# Patient Record
(demographics unavailable — no encounter records)

---

## 2018-03-01 NOTE — RAD
FRONTAL VIEW CHEST:

 

Date:  03/01/18

 

COMPARISON:  

04/09/17. 

 

INDICATION:

Dyspnea. 

 

FINDINGS:

Cardiac silhouette is enlarged with vascular congestion and interstitial edema bilaterally. Slight bl
unting of the costophrenic sulci present. There is vascular calcification. Leads overlie the chest li
miting detail. 

 

IMPRESSION: 

Evidence of fluid overload. Enlarged cardiac silhouette is present. Correlate for evidence of CHF. 

 

 

POS: [facility]

## 2018-03-01 NOTE — CON
DATE OF CONSULTATION:  03/01/2018

 

REFERRING PHYSICIAN:  [facility].

 

IMPRESSION:  Patient has some generalized weakness, which may be in part due to her hypercalcemia and
 vitamin deficiencies.  I suspect she may have a component of hypotension that is contributing to her
 generalized asterixis.

 

PLAN:

1.  Vitamin supplementation.

2.  Address hypercalcemia.

3.  Monitor orthostatic blood pressures and adjust medications accordingly.

 

HISTORY OF PRESENT ILLNESS:  Ms. Russell is a 64-year-old  female with a past history of end-sta
ge renal disease on hemodialysis.  She reports over the last week, she has gotten weaker in general a
nd was having trouble getting up from a chair.  She is barely able to walk.  She has also noticed jennifer
rly continuous jerking movements of her extremities.  She does not report any headache, nausea, vomit
ing, dizziness or fainting.  She denies a history of hypertension, although this was in her chart.  S
he was brought in for evaluation.  She had an MRI of the brain done, which was unremarkable.  She is 
noted to be relatively low in both B12 and folate.  Her calcium level was 10.1.  She was not anemic. 
 The remainder of her lab was otherwise unremarkable including a CPK.

 

PAST MEDICAL HISTORY:  As listed.

 

ALLERGIES:  NUMEROUS ADHESIVES and LEVAQUIN.

 

SOCIAL HISTORY:  No tobacco or illicit drug use.

 

FAMILY HISTORY:  Noncontributory.

 

REVIEW OF SYSTEMS:  No complaints of chest pain, but some shortness of breath.  Positive for joint pa
in in both of her knees.

 

PHYSICAL EXAMINATION:

GENERAL:  She is a morbidly obese woman lying in bed having dialysis done.

HEENT:  Pupils equal.  Conjunctivae are little muddy.  Oropharynx is somewhat dry.  Cranium normoceph
alic and atraumatic.

NECK:  Supple.

EXTREMITIES:  No cyanosis present.

NEUROLOGIC:  She is alert and cooperative.  Her speech is fluent and clear.  Cranial nerves were inta
ct.  Motor exam showed antigravity strength in all 4 extremities with prominent asterixis.  Gait was 
not testable.  Sensation was intact to light touch.  Could not really assess cerebellar function due 
to her general weakness and asterixis.

 

IMAGING:  MRI images were reviewed.

 

SUMMARY:  There does not appear to be an acute neurologic issue.  I suspect this is more metabolic an
d possibly related to some orthostatic hypotension.

## 2018-03-01 NOTE — CT
CT OF BRAIN PERFORMED WITHOUT CONTRAST ENHANCEMENT:

 

History: Altered mental status. Speech difficulties. 

 

Comparison: 5-16-15

 

FINDINGS: 

Ventricular and cisternal system shows fairly age appropriate change. There are no signs of intracere
bral hemorrhage or extraaxial fluid collections. Mastoid air cells and visualized sinuses are clear. 


 

IMPRESSION: 

No acute intracranial abnormalities. 

 

POS: [facility]

## 2018-03-01 NOTE — ULT
CAROTID DUPLEX SONOGRAM

3/1/18

 

HISTORY:

TIA. Vascular disease.

 

FINDINGS:  

 

RIGHT: 

Minimal plaque. Color and spectral doppler evaluation, peak systolic velocity of 95 cm/s, and IC to C
C ratio of 0.9 suggests no hemodynamically significant stenosis within the extracranial right ICA. An
tegrade flow is present within the vertebral artery. 

 

LEFT: 

Minimal plaque. Color and spectral doppler evaluation, peak systolic velocity of 93 cm/s, and IC to C
C ratio of 0.7 suggests no hemodynamically significant stenosis within the extracranial left ICA. Ant
egrade flow is present within the vertebral artery. 

 

IMPRESSION: 

No sonographic evidence of significant extracranial ICA stenosis. 

 

POS: [facility]

## 2018-03-01 NOTE — PDOC.FPRHP
- History of Present Illness


Chief Complaint: WEAKNESS


History of Present Illness: 





63 yo F w/ PMH incluidng HTN, ESRD, HLD, neuropathy, RACHID, and morbid obesity.


Woke up this morning feeling weak, able to transfer to chair but not walk on 

her own. She felt weak all over and denies focal weakness. She states she 

needed help getting out of bed. Weakness started about 10 days ago and has 

gotten progressively worse. She also notes that she has been having tremors 

specifically in her arms and legs which is new. She fell 2 times on 4 days ago, 

one mechanical getting out of bathtub and the other she was sitting down and 

missed the chair. She has some soreness in the L arm but denies any other pain. 

She states that she has had increased difficulty with speech both with thinking 

of what to say and dysarthria. No headaches, nausea, vomiting, or changes in 

vision. Family notices more confusion as of late.





- Allergies/Adverse Reactions


 Allergies











Allergy/AdvReac Type Severity Reaction Status Date / Time


 


adhesive Allergy   Verified 03/07/18 13:31


 


Latex, Natural Rubber Allergy   Verified 03/07/18 13:31


 


levofloxacin [From Levaquin] Allergy   Verified 03/07/18 13:31


 


povidone-iodine Allergy   Verified 03/07/18 13:31





[From Betadine]     


 


soap [From Betadine] Allergy   Verified 03/07/18 13:31














- Home Medications


 











 Medication  Instructions  Recorded  Confirmed  Type


 


Budesonide-Formoterol [Symbicort 1 puff INH BID 09/09/14 03/07/18 History





160-4.5]    


 


Cholecalciferol (Vitamin D3) 2,000 unit PO DAILY 12/07/15 03/07/18 History





[Vitamin D3]    


 


Montelukast Sodium [Singulair] 10 mg PO DAILY 12/07/15 03/07/18 History


 


Primidone 50 mg PO TID 12/07/15 03/07/18 History


 


Albuterol Sulfate [Proair 90 mcg IH Q6HR PRN 03/01/18 03/07/18 History





Respiclick]    


 


Benzonatate 100 mg PO Q6HR 03/01/18 03/07/18 History


 


Budesonide 0.5 mg IH BID 03/01/18 03/07/18 History


 


Celecoxib 200 mg PO BID 03/01/18 03/07/18 History


 


Fluticasone Propionate [Flonase 1 spray EA NARE HS 03/01/18 03/07/18 History





Nasal Spray]    


 


Midodrine HCl [ProAmatine] 2.5 mg PO ASDIR 03/01/18 03/07/18 History


 


Mupirocin 2% Cream [Bactroban 2% 1 applic TP BID 03/01/18 03/07/18 History





Cream]    


 


Sulfamethoxazole/Trimethoprim 1 tab PO BID 03/01/18 03/07/18 History





[Bactrim DS]    


 


hydrOXYzine [Atarax] 10 mg PO TID PRN 03/01/18 03/07/18 History


 


predniSONE 2.5 mg PO QAM-WM 03/01/18 03/07/18 History


 


Atorvastatin Calcium [Lipitor] 80 mg PO HS 30 Days #30 tab 03/02/18 03/07/18 Rx


 


Clopidogrel Bisulfate [Plavix] 75 mg PO DAILY 90 Days #30 tab 03/02/18 03/07/18 

Rx


 


Cyanocobalamin (Vitamin B-12) 1,000 mcg PO DAILY 60 Days #60 tab 03/02/18 03/07/ 18 Rx





[Vitamin B-12]    


 


Folic Acid [Folvite] 1 mg PO DAILY 30 Days #30 tab 03/02/18 03/07/18 Rx


 


Lanthanum Carbonate [Fosrenol] 500 mg PO TID-WM 30 Days #30 03/02/18 03/07/18 Rx





 tab.chew   


 


Famotidine 20 mg PO BID 03/07/18 03/07/18 History


 


Polyethylene Glycol 3350 [Miralax] 17 gm PO DAILY 03/07/18 03/07/18 History


 


Umeclidinium Bromide [Incruse 1 inh IH DAILY 03/07/18 03/07/18 History





Ellipta]    














- History


PMHx:


 HTN, Gerd, ESRD, HLD, RACHID, Morbidly obesity, Asthma





PSHx: 


Appendectomy, Splenectomy, 





FHx:


 Father- CAD 78


Mother- 70 infection?, patient unsure





Social:


 no tobacco, no alcohol, no drugs











- Review of Systems


General: reports: fatigue.  denies: fever/chills, weight/appetite/sleep changes


Eyes: denies: vision changes


ENT: denies: nasal congestion, rhinorrhea


Respiratory: reports: shortness of breath (with activity).  denies: cough


Cardiovascular: reports: edema (little worse than usual).  denies: chest pain, 

palpitation


Gastrointestinal: reports: constipation (last bm 4 days ago).  denies: nausea, 

vomiting, diarrhea


Genitourinary: reports: other (denies hematuria).  denies: dysuria, polyuria


Skin: denies: rashes, itching


Musculoskeletal: reports: arthritis/arthralgias.  denies: tenderness (L shoulder

)


Neurological: reports: other (no tingling, see hpi).  denies: numbness


Psychological: denies: anxiety, depression





- Vital signs


BP: 144/95  HR: 72 RR: 16 Tmax: 97.7 Pox: 99% on 2L  Wt: 131.7   








- Physical Exam


Constitutional: NAD, awake, alert and oriented


HEENT: normocephalic and atraumatic, PERRLA, EOMI, grossly normal vision


Neck: supple (very large neck)


Heart: RRR, normal S1/S2, no murmurs/rubs/gallops


Lungs: CTAB, other (not moving much air, very mild wheezes)


Abdomen: soft, non-tender, bowel sounds present


Musculoskeletal: normal structure


Neurological: CN II-XII intact, normal sensation, other (patient able to move 

all extremities, clear intention tremor in upper and lower extremities, able to 

lift legs off beds, cannot lift R arm off bed, able to lift L arm off bed, 

dysmetria/dysdiadocokinesia)


Skin: capillary refill <2 seconds, other (large dressed wound on abdomen 

reportedly from heating pad injury)





FMR H&P: Results





- Labs


Result Diagrams: 


 03/02/18 05:20





 03/02/18 05:20


Lab results: 


 











WBC  13.8 thou/uL (4.8-10.8)  H  03/01/18  08:45    


 


Hgb  12.0 g/dL (12.0-16.0)   03/01/18  08:45    


 


Hct  39.0 % (36.0-47.0)   03/01/18  08:45    


 


MCV  101.0 fl (81.0-99.0)  H  03/01/18  08:45    


 


Plt Count  475 thou/uL (130-400)  H  03/01/18  08:45    


 


Band Neuts % (Manual)  1 % (5-11)  L  03/01/18  08:45    


 


Sodium  136 mmol/L (136-145)   03/01/18  08:45    


 


Potassium  3.8 mmol/L (3.5-5.1)   03/01/18  08:45    


 


Chloride  93 mmol/L ()  L  03/01/18  08:45    


 


Carbon Dioxide  28 mmol/L (23-31)   03/01/18  08:45    


 


BUN  34 mg/dL (9.8-20.1)  H  03/01/18  08:45    


 


Creatinine  7.56 mg/dL (0.6-1.1)  H  03/01/18  08:45    


 


Glucose  86 mg/dL ()   03/01/18  08:45    


 


Calcium  11.1 mg/dL (7.8-10.44)  H  03/01/18  08:45    


 


Total Bilirubin  0.4 mg/dL (0.2-1.2)   03/01/18  08:45    


 


AST  19 U/L (5-34)   03/01/18  08:45    


 


ALT  18 U/L (8-55)   03/01/18  08:45    


 


Alkaline Phosphatase  69 U/L ()   03/01/18  08:45    


 


Creatine Kinase  151 U/L ()   03/01/18  08:45    


 


CK-MB (CK-2)  2.1 ng/mL (0-6.6)   03/01/18  08:45    


 


B-Natriuretic Peptide  28.1 pg/mL (0-100)   03/01/18  08:45    


 


Serum Total Protein  6.9 g/dL (6.0-8.3)   03/01/18  08:45    


 


Albumin  3.7 g/dL (3.4-4.8)   03/01/18  08:45    


 


Lipase  11 U/L (8-78)   03/01/18  08:45    














FMR H&P: A/P





- Problem List


(1) Tremor


Status: Acute   Code(s): R25.1 - TREMOR, UNSPECIFIED   





(2) Anemia in chronic kidney disease (CKD)


Status: Acute   Code(s): N18.9 - CHRONIC KIDNEY DISEASE, UNSPECIFIED; D63.1 - 

ANEMIA IN CHRONIC KIDNEY DISEASE   





(3) CHF (congestive heart failure)


Status: Acute   Code(s): I50.9 - HEART FAILURE, UNSPECIFIED   





(4) Peripheral neuropathy


Status: Acute   Code(s): G62.9 - POLYNEUROPATHY, UNSPECIFIED   





(5) Physical deconditioning


Status: Acute   Code(s): R53.81 - OTHER MALAISE   





(6) BMI 50.0-59.9, adult


Status: Chronic   Code(s): Z68.43 - BODY MASS INDEX (BMI) 50-59.9 , ADULT   





(7) ESRD (end stage renal disease) on dialysis


Status: Chronic   Code(s): N18.6 - END STAGE RENAL DISEASE; Z99.2 - DEPENDENCE 

ON RENAL DIALYSIS   





(8) Gastroesophageal reflux disease


Status: Chronic   Code(s): K21.9 - GASTRO-ESOPHAGEAL REFLUX DISEASE WITHOUT 

ESOPHAGITIS   


Qualifiers: 


   Esophagitis presence: esophagitis presence not specified   Qualified Code(s)

: K21.9 - Gastro-esophageal reflux disease without esophagitis   





(9) History of splenectomy


Status: Chronic   Code(s): Z90.81 - ACQUIRED ABSENCE OF SPLEEN   





(10) Hyperlipidemia


Status: Chronic   Code(s): E78.5 - HYPERLIPIDEMIA, UNSPECIFIED   





(11) RACHID on CPAP


Status: Chronic   Code(s): G47.33 - OBSTRUCTIVE SLEEP APNEA (ADULT) (PEDIATRIC)

; Z99.89 - DEPENDENCE ON OTHER ENABLING MACHINES AND DEVICES   





(12) Progressive focal motor weakness


Status: Acute   Code(s): R53.1 - WEAKNESS   





- Plan





# Generalized Weakness


- CT head negative


- Brain MRI


- Neurology consulted


- ASA


-PT/OT/ST


- rule out CVA





# New onset tremors


- neuro consulted


- B12, folate, ESR, TSH





# ESRD


- Nephro Dr. Cleveland consulted


- T, TH, S





# HLD


- Atorvastatin





# RACHID


- CPAP at night





# DM2


- patient denies taking meds


- check A1C





# GERD


- ranitidine





# Subjective Asthma


- Duonebs q4 PRN





# History of Splenectomy


- monitor for signs of infxn





# PPx


- SCDs


- hold pharmacologic until Brain MRI resulted





# 





FMR H&P: Upper Level





- Pertinent history


 64 year old  female with a past medical history of ESRD on Tuesday, 

Thursday, Saturday dialysis who presents for generalized weakness ongoing for 

several days. She had difficulty speaking yesterday. The only other symptoms 

she reports are fatigue and constipation. She denies fevers, chills, blurry 

vision, visual disturbance, rhinorrhea, nasal congestion, sore throat, chest 

pain, dyspnea, wheezing, cough, abdominal pain, nausea, vomiting, and diarrhea.

  Patient and her nephew who helps her at home report no facial droop, 

unilateral weakness/paralysis.








- Pertinent findings


 Patient obese. Awake, alert, and appropriately interactive. Lungs CTAB. No LE 

edema. CN II-XII intact. No facial droop. Generalized weakness on exam. Abdomen 

soft, NT, ND, +BS.








- Plan


Date/Time: 03/01/18 1229








I, Emerson Liang DO, have evaluated this patient and agree with findings/plan 

as outlined by intern resident. Pertinent changes/additions are listed here.








64 year old  female presents with:


1) Suspected TIA - Admit to stroke unit. MRI pending. CT negative. Stroke team 

consulted. Neurology consulted. Continue aspirin and statin


2) ESRD on dialysis with evidence of acute fluid overload - Will consult patient

's nephrologist, Dr. Cleveland. She is on Tuesday, Thursday, and Saturday dialysis. 

Patient is on Home O2 and denies respiratory symptoms


3) Leukocytosis - No clear cause. CXR not consistent with pneumonia. Patient 

does not produce urine. Blood culture collected. Antibiotics if fever or 

symptoms present








Attending Addendum





- Attending Addendum


Date/Time: 03/31/18 1312





I personally evaluated the patient and discussed the management with Dr. Vasquez 

on 3/1/17


I agree with the History, Examination, Assessment and Plan documented above 

with any addition or exceptions noted below.


63 yo F w/ PMH incluidng HTN, ESRD, HLD, neuropathy, RACHID, and morbid obesity 

here with worsening weakness and decreasing mental alertness and responsiveness 

for Neuro eval.

## 2018-03-01 NOTE — CON
DATE OF CONSULTATION:  03/01/2018

 

HISTORY OF PRESENT ILLNESS:  Ms. Russell is a 64-year-old  female with ESRD, having maintenance 
hemodialysis, and was admitted for generalized weakness.  According to the patient, she was not able 
to get out of bed.  Weakness was generalized and she could hardly move about.  For that reason, she w
as sent to the ER for further evaluation.

 

Essentially, a rule out CVA was done.  She underwent a CT scan of the brain without findings of any a
cute intracranial abnormalities.  Brain MRI also showed no acute abnormalities except for a mild 
ricky ischemic white matter changes.

 

We are being consulted for maintenance hemodialysis.  Today is her regular dialysis day.  I am alberto shepard dialyzing the patient, and I am at the bedside supervising her dialysis.  I am attempting about 4
 liter fluid removal.

 

REVIEW OF SYSTEMS:  Positive for generalized malaise, decreased motor strength with the lower extremi
ties.  No nausea, no vomiting.  Positive for chronic shortness of breath.  No chest pain, no diarrhea
, no constipation, no abdominal pain.  No fever or chills.  No diplopia, no syncopal episode, no hema
tochezia, no melena, no hematemesis.

 

MEDICATIONS:  DuoNeb q.4 hours p.r.n., Ecotrin 325 mg daily, Lipitor 80 mg at bedtime, Plavix 75 mg o
nce a day, Colace 100 mg p.o. b.i.d., hydralazine 10 mg IV q.4 hours p.r.n., Zofran 4 mg IV q.6 hours
 p.r.n.

 

HOME MEDICATIONS:  Fosrenol 3 tablets q.i.d. with meals, midodrine 2.5 mg daily as needed, Singulair 
10 mg every day, Lyrica 1-2 capsules p.o. t.i.d., primidone 50 mg p.o. t.i.d., Bactrim DS 1 tab b.i.d
., prednisone 2.5 mg q.a.m.

 

PAST MEDICAL HISTORY:

1.  ESRD, currently on maintenance hemodialysis.

2.  Hyperlipidemia.

3.  Morbid obesity.

4.  Longstanding hypertension.

5.  COPD.

6.  Obstructive sleep apnea.

7.  Hyperphosphatemia.

8.  GERD.

9.  Coronary artery disease.

 

PAST SURGICAL HISTORY:

1.  Status post PD catheter placement with subsequent removal.

2.  Status post AV fistula placement.

3.  Status post cuffed hemodialysis catheter placement.

4.  Status post upper and lower GI endoscopy.

5.  Status post exploratory laparotomy with splenectomy.

 

SOCIAL HISTORY:  The patient is a retired  from [employer]  Education, high school.  Lives i
n [place].  Single, no children.  Sedentary lifestyle.  No IV drug abuse.  Status post blood transfusio
n.  No alcohol intake.  No history of smoking.

 

FAMILY HISTORY:  Positive family history of ESRD.

 

ALLERGIES:  None.

 

TRAUMA:  None.

 

IMMUNIZATIONS:  Up-to-date.

 

HOSPITALIZATIONS:  Please see past medical history.

 

PHYSICAL EXAMINATION:

VITAL SIGNS:  Blood pressure is noted at 122/53, heart rate 71, respiratory rate 12, pulse ox 98%.

GENERAL:  Awake, alert, comfortable, morbidly obese.

SKIN:  Adequate turgor.

HEENT:  Pinkish conjunctivae.  Anicteric sclerae.

NECK:  No neck mass, no carotid bruits, no JVD.

CHEST:  No deformities.

LUNGS:  Clear breath sounds.  No wheezing, no crackles.

HEART:  Normal sinus rhythm.  No murmur, no gallops, no rubs.

ABDOMEN:  Globular, soft, nontender.  No masses.

EXTREMITIES:  No edema, no deformities.

 

LABORATORY DATA:  03/01/2018, white count 13.8, hemoglobin 12.  Sodium 136, potassium 3.8, chloride 9
3, carbon dioxide 28, BUN is 34, creatinine 7.56, calcium 11.1, AST 19, ALT 18.

 

ASSESSMENT AND PLAN:

1.  Mild hypercalcemia.  We will simply observe this.  Adjust binders as needed.  Currently, she is n
ot on any binders.  She will probably need Fosrenol 1000 mg t.i.d. with meals.

2.  End-stage renal disease, stable.  Tolerating current hemodialysis regimen.  Attempting 4 liter fl
uid removal with this patient.  Please note, she has a history of volume overload.  For this reason, 
we are maxing out fluid removal.

3.  Generalized malaise/weakness - unclear etiology.  CAT scan was negative.  If needed, we can consi
[facility] Neurology consult.  It is possible that this could be related from some of her medications.  Laurel graves note, she takes primidone and Lyrica.  We will hold that medication for the moment.

## 2018-03-01 NOTE — MRI
MRI BRAIN NONCONTRAST:

 

DATE: 

3-1-18

 

HISTORY:

64-year-old female with altered mental status and dysarthria. Rule out CVA.

 

FINDINGS:

The ventricles are normal in size and configuration.  There is no restricted diffusion, midline shift
 or any other mass effect, recent intraaxial hemorrhage, or extraaxial fluid collection.  There are a
 few scattered punctate T2-hyperintensities in the cerebral white matter consistent with mild chronic
 ischemic white matter changes due to mild microvascular atherosclerosis.

 

IMPRESSION:

1. Mild chronic ischemic white matter changes.

2. Otherwise negative.

 

 

jn

 

POS: [facility]

## 2018-03-02 NOTE — PDOC.FM
- Subjective


Subjective: 





This morning Mrs. Russell states she had some anxiety overnight which made her 

feel short of breath. She states this is a common occurrence at home, when it 

happens she takes a duoneb and this resolves it. She denies any pain this 

morning. She states she still feels weak but her tremors are improved.





- Objective


Vital Signs & Weight: 


 Vital Signs (12 hours)











  Temp Pulse Pulse Resp BP BP Pulse Ox


 


 03/02/18 08:04   88   22 H    90 L


 


 03/02/18 07:20    84   127/60  


 


 03/02/18 07:05  97.9 F  83   20   107/52 L  84 L


 


 03/02/18 04:00  98.4 F  88   16   109/55 L  90 L


 


 03/02/18 01:08        93 L











I&O: 


 











 03/01/18 03/02/18 03/03/18





 06:59 06:59 06:59


 


Intake Total  120 


 


Output Total  0 


 


Balance  120 











Result Diagrams: 


 03/02/18 05:20





 03/02/18 05:20





<Warren Rock - Last Filed: 03/02/18 08:40>





- Objective


Vital Signs & Weight: 


 Weight











Admit Weight                   131.723 kg


 


Weight                         131.723 kg














I&O: 


 











 03/02/18 03/03/18 03/04/18





 06:59 06:59 06:59


 


Intake Total 120  


 


Output Total 0  


 


Balance 120  











Result Diagrams: 


 03/02/18 05:20





 03/02/18 05:20





<Marisela Mohan - Last Filed: 03/03/18 09:14>





Phys Exam





- Physical Examination


HEENT: PERRLA, moist MMs


Neck: no nodes, full ROM


large neck


mild expiratory wheezes bilaterally, good air movement


Cardiovascular: RRR, no significant murmur


Gastrointestinal: soft, non-tender, positive bowel sounds


Musculoskeletal: pulses present, edema present


trace edema


intention tremor persists, able to lift all four extremities off bed


Psychiatric: normal affect, A&O x 3


Skin: no rash, normal turgor, cap refill <2 seconds





<Warren Rock - Last Filed: 03/02/18 08:40>





Dx/Plan


(1) Tremor


Code(s): R25.1 - TREMOR, UNSPECIFIED   Status: Acute   





(2) Anemia in chronic kidney disease (CKD)


Code(s): N18.9 - CHRONIC KIDNEY DISEASE, UNSPECIFIED; D63.1 - ANEMIA IN CHRONIC 

KIDNEY DISEASE   Status: Acute   





(3) CHF (congestive heart failure)


Code(s): I50.9 - HEART FAILURE, UNSPECIFIED   Status: Acute   





(4) Peripheral neuropathy


Code(s): G62.9 - POLYNEUROPATHY, UNSPECIFIED   Status: Acute   





(5) Physical deconditioning


Code(s): R53.81 - OTHER MALAISE   Status: Acute   





(6) BMI 50.0-59.9, adult


Code(s): Z68.43 - BODY MASS INDEX (BMI) 50-59.9 , ADULT   Status: Chronic   





(7) ESRD (end stage renal disease) on dialysis


Code(s): N18.6 - END STAGE RENAL DISEASE; Z99.2 - DEPENDENCE ON RENAL DIALYSIS 

  Status: Chronic   





(8) Gastroesophageal reflux disease


Code(s): K21.9 - GASTRO-ESOPHAGEAL REFLUX DISEASE WITHOUT ESOPHAGITIS   Status: 

Chronic   


  QualifierTitle:    Esophagitis presence: esophagitis presence not specified   

Qualified Code(s): K21.9 - Gastro-esophageal reflux disease without esophagitis

   





(9) History of splenectomy


Code(s): Z90.81 - ACQUIRED ABSENCE OF SPLEEN   Status: Chronic   





(10) Hyperlipidemia


Code(s): E78.5 - HYPERLIPIDEMIA, UNSPECIFIED   Status: Chronic   





(11) RACHDI on CPAP


Code(s): G47.33 - OBSTRUCTIVE SLEEP APNEA (ADULT) (PEDIATRIC); Z99.89 - 

DEPENDENCE ON OTHER ENABLING MACHINES AND DEVICES   Status: Chronic   





(12) Progressive focal motor weakness


Code(s): R53.1 - WEAKNESS   Status: Acute   





- Plan


Plan: 





# Generalized Weakness


- CT head negative


- Brain MRI shows chronic changes


- Neurology consulted


- ASA


-PT/OT/ST


- TIA unlikely





# New onset tremors


- neuro consulted


- B12/folate are low, will replace





# ESRD


- Nephro Dr. Cleveland consulted


- T, TH, S


- fluid removed in dialysis





# HLD


- Atorvastatin





# RACHID


- CPAP at night





# DM2


- patient denies taking meds


- check A1C





# GERD


- ranitidine





# Subjective Asthma


- Duonebs q4 PRN





# History of Splenectomy


- monitor for signs of infxn





# PPx


- SCDs








<Warren Rock - Last Filed: 03/02/18 08:40>





Attending Addendum





- Attending Addendum


Date/Time: 03/03/18 0911





I personally evaluated the patient and discussed the management with Dr. Rock 

on 3/2/18.


I agree with the History, Examination, Assessment and Plan documented above 

with any addition or exceptions noted below.


Patient's generalized weakness likely multifactorial, initiated by medication 

interaction with Lyrica, electrolyte abnormalities caused by dialysis 

noncompliance, and concern for TIA.  Now severely deconditioned.  Will start 

secondary stroke prevention for TIA with 90 days of ASA/Plavix followed by 

lifetime treatment with ASA and statin.  PT/OT eval today, pt would strongly 

benefit from inpt rehab.  In addition, will hold Lyrica to see if symptoms 

resolve.





<Marisela Mohan - Last Filed: 03/03/18 09:14>

## 2018-03-03 NOTE — DIS-2
DATE OF ADMISSION:  03/01/2018

 

DATE OF DISCHARGE:  03/02/2018

 

RESIDENT:  Dr. Warren Rock.

 

ADMITTING ATTENDING:  Dr. Lucio Riggs.

 

DISCHARGE ATTENDING:  Dr. Marisela Mohan.

 

CONSULTATIONS:  Neurology.

 

PROCEDURES:  None.

 

PRIMARY DIAGNOSIS:  Generalized weakness.

 

SECONDARY DIAGNOSES:  Cerebrovascular accident history, new onset tremors, ESRD, hyperlipidemia, obst
ructive sleep apnea, diabetes type 2, GERD, asthma, chronic kidney disease.

 

DISCHARGE MEDICATIONS:  Atorvastatin 80 mg, Plavix 75 mg for 90 days, vitamin D12, folic acid, Fosren
ol 500 mg t.i.d., midodrine 2.5 mg, Benzonatate 100 mg, Symbicort, celecoxib, hydroxyzine, DuoNeb, mo
ntelukast, primidone, ranitidine.

 

DISCONTINUED MEDICATIONS:  Calcium and Lyrica.

 

HISTORY OF PRESENT ILLNESS AND HOSPITAL COURSE:  This 64-year-old female with past medical history in
cluding hypertension, ESRD, hyperlipidemia, neuropathy, obstructive sleep apnea, morbid obesity, pres
ented to the ED after feeling excessively weak the morning of presentation.  She says she felt weak a
ll over and denied any focal weakness.  She states she has new onset tremor, which has been going on 
for about 10 days and getting progressively worse.  She states that she is unable to transfer from be
d to wheelchair, which she was formally able to do, this has been going on for the last 10 days or so
.  She also thinks she has increased difficulty with speech both thinking what to say and dysarthria.
  Denies headaches, nausea, vomiting or changes in vision.  Family has noticed more confusion as of l
ate.

 

CT head in the ER was negative.  Brain MRI showed chronic CVA changes.  Neurology came and visited th
e patient and did not think this was neurological in nature, but rather metabolic.  The patient's Lyr
ica was discontinued as this is known to cause tremors in dialysis patients.  Furthermore, the patien
t was started on Fosrenol renal due to mildly high calcium at 10.4.  The patient's B12 and folate wer
e both low in the hospital.  Therefore, she was started on supplementation.  The patient is discharge
d to inpatient rehabilitation.  Family is in agreement with this plan.

 

DISPOSITION:  Stable.

 

DISCHARGE INSTRUCTIONS:

1.  Location:  Home.

2.  Diet:  Regular.

3.  Activity:  As tolerated.

4.  Follow up with Dr. Marisela Mohan in clinic within 1 week.

## 2018-03-03 NOTE — EKG
Test Reason : WEAK

Blood Pressure : ***/*** mmHG

Vent. Rate : 073 BPM     Atrial Rate : 073 BPM

   P-R Int : 226 ms          QRS Dur : 138 ms

    QT Int : 444 ms       P-R-T Axes : 073 -07 088 degrees

   QTc Int : 489 ms

 

Sinus rhythm with 1st degree A-V block

Left bundle branch block

Abnormal ECG

 

Confirmed by MARYANNE RAMIREZ, MAHIN ([id number]),  DEEPAK HATFIELD ([id number]) on 3/3/2018 1:04:30 PM

 

Referred By:             Confirmed By:MAHIN MADDEN MD

## 2018-03-08 NOTE — SPC
DIALYSIS FISTULOGRAM LEFT UPPER EXTREMITY

PERCUTANEOUS BALLOON ANGIOPLASTY 

LEFT UPPER EXTREMITY DIALYSIS FISTULA:

 

History: Renal failure. Poor function and difficult access of left upper extremity fistula. 

 

FINDINGS: 

After explaining the procedure and answering all questions, the left upper extremity was prepped and 
draped in the usual sterile fashion. Sterile technique, buffered local anesthesia, and a 22 gauge nee
dle were used to carefully access the peripheral portion of a left upper arm dialysis fistula just ab
ove the level of the antecubital fossa, directed towards the venous outflow. A 4 Kenyan micropuncture
 sheath was placed for serial imaging. The cephalic fistula is very tortuous and dilated without clot
. Flow was somewhat slow due to the caliber of the fistula. The venous outflow and superior vena cava
 are widely patent. 

 

Initial attempts at reflux of the arterial anastomosis were not successful due to the dilatation of t
he fistula and increased capacity of fluid. There was suggestion of stenosis near the arterial anasto
mosis. 

 

A second access was obtained more centrally, directed towards the arterial end flow. A 5 Kenyan stiff
 micropuncture sheath was placed for limited imaging, then a short 6 Kenyan sheath was placed, throug
h which a 5 Kenyan Berenstein catheter and .035 glide wire were used to advance the catheter to the c
entral aspect of the fistula. Two areas of stenosis were seen just peripheral to the arterial anastom
osis. 

 

A 5 mm x 4 cm balloon was then placed in the areas of fistula stricture near the antecubital fossa. F
ull balloon profile was achieved, improving vessel diameter. Serial imaging showed the arterial anast
omosis to be patent. Final imaging showed improved caliber and flow throughout the left upper arm fis
juliet. 

 

IMPRESSION: 

Technically successful balloon angioplasty of the arterial inflow of the left upper arm dialysis fist
ernesto. While flow was improved, the dilatation of the fistula and patient's relatively low blood pressu
re and pulse result in less than vigorous flow throughout the fistula. There is no evidence of venous
 outflow limitation. 

 

POS: ADOLFO

## 2018-04-05 NOTE — CON
DATE OF CONSULTATION:  04/05/2018

 

HISTORY OF PRESENT ILLNESS:  Ms. Russell is a 64-year-old  female with ESRD - on maintenance hem
odialysis and admitted for left upper extremity cellulitis.  She was seen in the dialysis unit and at
 that time has received IV antibiotics.  However, the lesion seems to have worsened.  The patient has
 now been admitted for further management.  We are consulted for maintenance hemodialysis.  She is cu
rrently undergoing dialysis, and I am at the bedside supervising her dialysis.

 

REVIEW OF SYSTEMS:  Denies any fever or chills.  Positive for left upper extremity erythema and excor
iation.  No nausea, no vomiting.  Appetite is fair.  Energy level is fair.  No abdominal pain, no laxmi
ss hematuria, no dysuria, no urinary frequency.  Occasional joint pains.  No headache, no diplopia, n
o sore throat, no nasal discharge.

 

PAST MEDICAL HISTORY:

1.  ESRD - on maintenance hemodialysis.

2.  Hypertension.

3.  GERD.

4.  Asthmatic bronchitis.

5.  Hyperlipidemia.

6.  Obstructive sleep apnea.

7.  Neuropathy.

8.  History of chronic hyperphosphatemia.

9.  Coronary artery disease

10.  Longstanding hypertension.

11.  Morbid obesity.

 

PAST SURGICAL HISTORY:  Status post PD catheter placement with subsequent removal.  Status post explo
ratory laparotomy with splenectomy.  Status post upper and lower GI endoscopy.  Status post cuffed he
modialysis catheter placement.  Status post AV fistula placement.

 

SOCIAL HISTORY:  The patient is a retired  from [employer].  Education:  High school.  Lives 
in [place].  No alcohol intake.  No history of smoking.  Status post blood transfusion.  Sedentary life
style.  No IV drug abuse.  Single.  No children.

 

ALLERGIES:  None.

 

TRAUMA:  None.

 

IMMUNIZATIONS:  Up to date.

 

HOSPITALIZATIONS:  Please see past medical history.

 

FAMILY HISTORY:  No family history of ESRD.

 

PHYSICAL EXAMINATION:

VITAL SIGNS:  Blood pressure is noted at 131/63, heart rate is 77, respiratory rate 18, temperature 9
7.9 and pulse ox 98% on 2 liters.

GENERAL:  Awake, alert, supine, comfortable, not in distress, morbidly obese.

SKIN:  Adequate turgor.

HEENT:  She has pinkish conjunctivae, anicteric sclerae.

NECK:  No neck mass, no carotid bruits, no JVD.

CHEST:  No deformities.

LUNGS:  Clear breath sounds.  No wheezing, no crackles heard.

HEART:  Normal sinus rhythm.  No murmur, no gallops, no rubs.

ABDOMEN:  Globular, soft and nontender.  No masses.

EXTREMITIES:  No edema.  She has erythema on the right upper extremity.

 

MEDICATIONS:  Medication of 04/05/2018; currently, on Pepcid 20 mg p.o. b.i.d., status post fluconazo
le, status post Zosyn and status post vancomycin.

 

LABORATORY DATA:  Laboratories of 04/05/2018; white count 13.2, hemoglobin 12.2 and hematocrit 37.8. 
 Sodium 139, potassium 4.2, chloride 97, carbon dioxide 31, BUN 37, creatinine 7.32, glucose 103, david
cium is 10.7, AST 10 and ALT less than 7.

 

ASSESSMENT AND PLAN:

1.  Left upper extremity cellulitis, status post Zosyn and vancomycin as well as fluconazole.  The pa
jan will be seen by ID to get further recommendations whether to treat with IV antibiotics.

2.  End-stage renal disease, stable.  Currently, undergoing hemodialysis, still attempting 3-4 liters
 of fluid removal as tolerated.  No changes will be made with this current hemodialysis regimen.  Rev
iew of the last Kt/V suggests she is adequately dialyzed with the current dialysis regimen.

3.  Mild hypercalcemia.  Continue to observe.  Recheck base met and CBC in a.m.

## 2018-04-05 NOTE — RAD
AP VIEW CHEST:

 

INDICATIONS:

History of fever and infection of the dialysis shunt.

 

FINDINGS:

There is cardiomegaly with mild pulmonary vascular congestion.  No focal consolidation is evident.  T
here is scattered calcified granuloma.  There is a stable, anteriorly dislocated right shoulder.  The
re is distal clavicle osteolysis involving both clavicles that appears similar to the prior exam.

 

IMPRESSION:

1.  Stable cardiomegaly.

 

2.  Stable findings of prior granulomatous disease.

 

3.  Stable anteriorly dislocated right glenohumeral joint.

 

POS: KARLA
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
## 2018-04-06 NOTE — PRG
DATE OF SERVICE:  04/06/2018

 

SUBJECTIVE:  Ms. Russell is a 64-year-old  female with ESRD and admitted for ? of left upper ext
remity cellulitis/dermatitis.  ID has been consulted.  The plan is to do a skin biopsy.  She received
 hemodialysis yesterday without any difficulty.  We were able to avoid the skin lesion with regards t
o the cannulation of her AV fistula.  No other complaints today.  She does complain of chronic itchin
g.

 

PHYSICAL EXAMINATION: 

VITAL SIGNS:  Blood pressure 108/64, heart rate 84, respiratory rate 16, temperature 98.3, pulse ox 9
3%.

GENERAL:  Noted to be awake, sitting comfortable, obese.

SKIN:  Adequate turgor.

HEENT:  She has pinkish conjunctivae, anicteric sclerae.

NECK:  No neck mass, no carotid bruits, no JVD.

CHEST:  No deformities.

LUNGS:  Clear breath sounds.  No wheezing.  No crackles.

HEART:  Normal sinus rhythm.  No murmur, no gallops or rubs.

ABDOMEN:  Globular, soft, nontender, no masses.

EXTREMITIES:  No edema, no deformities.

 

MEDICATIONS:  04/06/2018 - Reviewed.

 

LABORATORY:  04/06/2018 - White count 11.1, hemoglobin 11.4.  Sodium 138, potassium 3.7, chloride 99,
 carbon dioxide 29, BUN 16, creatinine 4.65, calcium 10.1.

 

ASSESSMENT AND PLAN:

1.  End-stage renal disease, stable.  Continue current Tuesday, Thursday, and Saturday dialysis regim
en.  Fluid removal only as tolerated using no or minimal heparin.

2.  Hyperphosphatemia currently on PhosLo.

3.  Left upper extremity skin lesion - unclear if this is simple dermatitis versus cellulitis.  ID ha
s been consulted.  The plan is to do a skin punch biopsy.  Overall, continue supportive care.  

 

Recheck base met and CBC in a.m.

## 2018-04-06 NOTE — PDOC.FM
- Subjective


Subjective: 





No acute events overnight. Pt was taken to dialysis yesterday and they were 

able to access via av fistula. Pt reports itching this am. Given hydroxizine w/

o relief. Denies cp, sob, nvdc, fever, chills, sweats. She is asking "when can [

she] go home."





- Objective


Vital Signs & Weight: 


 Vital Signs (12 hours)











  Temp Pulse Resp BP Pulse Ox


 


 04/06/18 08:00  98.3 F  84  16   93 L


 


 04/06/18 07:32  98.3 F  84  16  108/64  93 L


 


 04/06/18 07:24   80  16  


 


 04/06/18 07:17   80  16  


 


 04/06/18 04:00  98.2 F  94  20  100/52 L  93 L


 


 04/06/18 00:58      94 L


 


 04/06/18 00:53   75  16   94 L








 Weight











Admit Weight                   127.006 kg


 


Weight                         127.006 kg














I&O: 


 











 04/05/18 04/06/18 04/07/18





 06:59 06:59 06:59


 


Intake Total  240 


 


Output Total  3500 


 


Balance  -3260 











Result Diagrams: 


 04/06/18 04:17





 04/06/18 04:17





<Torsten Stephens - Last Filed: 04/06/18 09:17>





- Objective


Vital Signs & Weight: 


 Vital Signs (12 hours)











  Temp Pulse Resp BP Pulse Ox


 


 04/06/18 11:51  98.2 F  73  18  133/80  92 L


 


 04/06/18 08:00  98.3 F  84  16   93 L


 


 04/06/18 07:32  98.3 F  84  16  108/64  93 L


 


 04/06/18 07:24   80  16  


 


 04/06/18 07:17   80  16  


 


 04/06/18 04:00  98.2 F  94  20  100/52 L  93 L








 Weight











Admit Weight                   127.006 kg


 


Weight                         127.006 kg














I&O: 


 











 04/05/18 04/06/18 04/07/18





 06:59 06:59 06:59


 


Intake Total  240 


 


Output Total  3500 


 


Balance  -3260 











Result Diagrams: 


 04/06/18 04:17





 04/06/18 04:17





<Neo Bateman - Last Filed: 04/06/18 13:22>





Phys Exam





- Physical Examination


Constitutional: NAD


morbidly obese


HEENT: PERRLA, sclera anicteric


Neck: no nodes, full ROM


Respiratory: no wheezing, no rales, no rhonchi, clear to auscultation bilateral


Cardiovascular: RRR, no significant murmur, no rub


Gastrointestinal: soft, non-tender, no distention, positive bowel sounds


Musculoskeletal: pulses present


Neurological: non-focal, moves all 4 limbs


Deviation from normal: erythematous papules/putules over LUE w/ dry crusting. 

Nonpainful. 





<Torsten Stephens - Last Filed: 04/06/18 09:17>





Dx/Plan


(1) Cellulitis of left arm


Code(s): L03.114 - CELLULITIS OF LEFT UPPER LIMB   Status: Acute   





(2) ESRD (end stage renal disease) on dialysis


Code(s): N18.6 - END STAGE RENAL DISEASE; Z99.2 - DEPENDENCE ON RENAL DIALYSIS 

  Status: Chronic   





(3) CHF (congestive heart failure)


Code(s): I50.9 - HEART FAILURE, UNSPECIFIED   Status: Chronic   





(4) Asthma


Code(s): J45.909 - UNSPECIFIED ASTHMA, UNCOMPLICATED   Status: Chronic   





(5) Benign hypertension


Code(s): I10 - ESSENTIAL (PRIMARY) HYPERTENSION   Status: Chronic   





(6) Hyperlipidemia


Code(s): E78.5 - HYPERLIPIDEMIA, UNSPECIFIED   Status: Chronic   





(7) RACHID on CPAP


Code(s): G47.33 - OBSTRUCTIVE SLEEP APNEA (ADULT) (PEDIATRIC); Z99.89 - 

DEPENDENCE ON OTHER ENABLING MACHINES AND DEVICES   Status: Chronic   





(8) Obesity hypoventilation syndrome


Code(s): E66.2 - MORBID (SEVERE) OBESITY WITH ALVEOLAR HYPOVENTILATION   Status

: Chronic   





- Plan


Plan: 





1) Cellulitis of lt arm: Pts erythema is unchanged. Will cont current abx for 

now. ID has been consulted, appreciate recommendations. 





2) ESRD on dialysis: Dr. hay consulted. Will cont current TRSa dialysis hung





3) RACHID on cpap in addition to pickwickian :CPAP QHS





4) Asthma; chronic with hypoxia on home O2. 


-O2 requirements unchanged, monitor sats and maintain >92%. Cont home 

medications





5) HTN: BP stable, monitor. 





6) HLD: Home meds. No change. 





7) Peripheral neuropathy: hold lyrica as pt developed tremor. Cont b12 and 

folate.


-cont plan of care





8) HFpEF: from prior echo studies; no evidence of acute exacerbation. Will 

titrate medications once acute cellulitis is resolved. Cont plan of care.





9) Pruritis; chornic, cont hydroxizine. Add benadryl and calamine lotion while 

in hospital. Hold tramadol. 





<Torsten Stephens - Last Filed: 04/06/18 09:17>





Attending Addendum





- Attending Addendum


Date/Time: 04/06/18 1321





I personally evaluated the patient and discussed the management with Dr. Stephens.


I agree with the History, Examination, Assessment and Plan documented above 

with any addition or exceptions noted below.





Patient admitted for possible cellulitis over her HD access site that has been 

persistent despite multiple antibiotic courses in the outpatient setting. She 

reports this has been here for months. Awaiting ID recs on treatment and may 

speak to Dr. Avina about biopsy over the access site. WBC downtrending and 

patient afebrile. Continue antibiotics currently. 








<Neo Bateman - Last Filed: 04/06/18 13:22>
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
## 2018-04-06 NOTE — HP
HISTORY:  Dhaval Russell is a 64-year-old morbidly obese female, 4 feet 11 inches and 280 pounds, 56 BMI
.  Dialysis patient, diabetic that had seen in the past providing dialysis access and repairing aneur
ysms.  Her left upper arm fistula has lasted for more than 20 years.  She has had a drill procedure. 
 She has been admitted because of access difficulty.  She has multiple skin lesions around the left u
pper extremity access.  She has had a past repair of an aneurysm.  I have been asked by Dr. Goodwin to 
perform punch biopsies for both histopathology and microbiology for lichen planus and eosinophilia, d
rug eruption evaluation.

 

PHYSICAL EXAMINATION:

GENERAL:  The patient is alert and oriented.  The patient is morbidly obese.

EXTREMITIES:  Left upper arm fistula, good thrill and bruit.

SKIN:  She has skin lesions over the left upper arm, not consistent with cellulitis.  There are multi
ple raised lesions.

 

ASSESSMENT AND PLAN:  Left upper arm skin lesion as well as right arm.  We will plan punch biopsies f
or histopathology and a tissue culture at the bedside.  We will use local anesthesia.

## 2018-04-07 NOTE — PDOC.FM
- Subjective


Subjective: 





No acute events overnight. Pt denies fever, chills, sweats, nvdc. Does report 

pruritis, diffuse. Has had chronic pruritis in the past. 





- Objective


Vital Signs & Weight: 


 Vital Signs (12 hours)











  Temp Pulse Resp BP BP Pulse Ox


 


 04/07/18 08:00  98.4 F  84  20  108/68   97


 


 04/07/18 06:50   83  20    94 L


 


 04/07/18 04:00  98.2 F  83  20   141/79 H  94 L








 Weight











Admit Weight                   127.006 kg


 


Weight                         127.006 kg














I&O: 


 











 04/06/18 04/07/18 04/08/18





 06:59 06:59 06:59


 


Intake Total 240 720 


 


Output Total 3500  


 


Balance -3260 720 











Result Diagrams: 


 04/07/18 03:38





 04/07/18 03:38





<Torsten Stephens - Last Filed: 04/07/18 13:33>





- Objective


Vital Signs & Weight: 


 Weight











Admit Weight                   280 lb


 


Weight                         280 lb














I&O: 


 











 04/07/18 04/08/18 04/09/18





 06:59 06:59 06:59


 


Intake Total 720  


 


Balance 720  











Result Diagrams: 


 04/07/18 03:38





 04/07/18 03:38





<Tulio Almaraz - Last Filed: 04/08/18 11:49>





Phys Exam





- Physical Examination


Constitutional: NAD


HEENT: PERRLA, sclera anicteric


Neck: no nodes, no JVD, supple


Respiratory: no wheezing, no rales, no rhonchi, clear to auscultation bilateral


Cardiovascular: RRR, no significant murmur, no rub


Gastrointestinal: soft, non-tender, no distention, positive bowel sounds


Musculoskeletal: no edema, pulses present


Neurological: non-focal, moves all 4 limbs


Deviation from normal: dry dressing in place over LUE s/p biopsy





<Torsten Stephens - Last Filed: 04/07/18 13:33>





Dx/Plan


(1) Cellulitis of left arm


Code(s): L03.114 - CELLULITIS OF LEFT UPPER LIMB   Status: Ruled-out   





(2) ESRD (end stage renal disease) on dialysis


Code(s): N18.6 - END STAGE RENAL DISEASE; Z99.2 - DEPENDENCE ON RENAL DIALYSIS 

  Status: Chronic   





(3) CHF (congestive heart failure)


Code(s): I50.9 - HEART FAILURE, UNSPECIFIED   Status: Chronic   





(4) Asthma


Code(s): J45.909 - UNSPECIFIED ASTHMA, UNCOMPLICATED   Status: Chronic   





(5) Benign hypertension


Code(s): I10 - ESSENTIAL (PRIMARY) HYPERTENSION   Status: Chronic   





(6) Hyperlipidemia


Code(s): E78.5 - HYPERLIPIDEMIA, UNSPECIFIED   Status: Chronic   





(7) RACHID on CPAP


Code(s): G47.33 - OBSTRUCTIVE SLEEP APNEA (ADULT) (PEDIATRIC); Z99.89 - 

DEPENDENCE ON OTHER ENABLING MACHINES AND DEVICES   Status: Chronic   





(8) Obesity hypoventilation syndrome


Code(s): E66.2 - MORBID (SEVERE) OBESITY WITH ALVEOLAR HYPOVENTILATION   Status

: Chronic   





- Plan


Plan: 





1) Cellulitis of lt arm: cultures are negative thus far. Biopsy pending for 

vasculitis vs ai vs drug eruption dc abx and dc home. 





2) ESRD on dialysis: dialyzed today, ok for dc to home. Resume normal dialysis 

schedule. 





3) RACHID on cpap in addition to pickwick[place] :CPAP QHS dc home today





4) Asthma; chronic with hypoxia on home O2. 


-O2 requirements unchanged, monitor sats and maintain >92%. Cont home 

medications, stable. DC home today 





5) HTN: BP stable, monitor. DC home today 





6) HLD: Stable, dc home





7) Peripheral neuropathy: hold lyrica as pt developed tremor. Cont b12 and 

folate.


-cont plan of care, DC home today 





8) HFpEF: from prior echo studies; no evidence of acute exacerbation. Will 

titrate medications once acute cellulitis is resolved. Cont plan of care. 

Stable. DC home today 





9) Pruritis; chornic, cont hydroxizine. Add benadryl and calamine lotion while 

in hospital. Hold tramadol. OP workup. DC home today. 








<Torsten Stephens - Last Filed: 04/07/18 13:33>





Attending Addendum





- Attending Addendum


Date/Time: 04/08/18 [id number]





I personally evaluated the patient and discussed the management with Dr. Stephens


I agree with the History, Examination, Assessment and Plan documented above 

with any addition or exceptions noted below.


Cellulitis is improving and planned for discharge.





<Tulio Almaraz - Last Filed: 04/08/18 11:49>

## 2018-04-07 NOTE — PRG
DATE OF SERVICE:  04/07/2018

 

SERVICE:  Renal Medicine.

 

SUBJECTIVE:  Ms. Russell is a 64-year-old  female with ESRD and being followed up by [facility]
ce for maintenance hemodialysis.  She was initially admitted for ? of left upper extremity cellulitis
.  ID has been consulted.  A punch biopsy of the skin lesion was done and the feeling is that this ma
y not be an infectious process.  She is currently at the dialysis and I am supervising her and I am a
t the bedside.  She voices no new complaints except for chronic pruritus.  I have discussed the pruri
tus issue.  I told her we could consider referring her to Dermatology at [place] for UV light
 treatment.

 

The patient denies any chest pain or shortness of breath.

 

OBJECTIVE:

VITAL SIGNS:  Blood pressure is noted at 108/68, heart rate 84, respiratory rate 20, temperature 98.4
 and pulse ox 97%.

GENERAL:  Noted to be awake, alert and comfortable, not in distress.

SKIN:  Adequate turgor.

HEENT:  Pinkish conjunctivae, anicteric sclerae.

NECK:  No neck mass, no carotid bruits, no JVD.

CHEST:  No deformities.

LUNGS:  Clear breath sounds.  No wheezing, no crackles.

HEART:  Normal sinus rhythm.  No murmur, no gallops, no rubs.

ABDOMEN:  Globular, soft and nontender.  No masses.

EXTREMITIES:  No edema, no deformities.

 

MEDICATIONS:  Medications of 04/07/2018 reviewed.

 

LABORATORY DATA:  Laboratories of 04/07/2018; white count 10.2, hemoglobin 11.3 and hematocrit 34.4. 
 Sodium 138, potassium 3.8, chloride 98, carbon dioxide 30, BUN 20, creatinine 6.84 and calcium is 10
.7.

 

ASSESSMENT AND PLAN:

1.  End-stage renal disease - stable.  Continue current Tuesday, Thursday, and Saturday hemodialysis 
regimen.  Again, fluid removal only as tolerated.

2.  Chronic pruritus, supportive care.  Eventual referral to [facility] for UV light if the carlos alberto
ent will agree with this.

3.  Left upper extremity skin lesion - unclear etiology.  Skin biopsy has been done yesterday, awaiti
ng results.

 

Overall, agree with current management.

## 2018-04-09 NOTE — DIS-2
LOCATION:  [facility] [place], [place].

 

DATE OF ADMISSION:  04/05/2018

 

DATE OF DISCHARGE:  04/07/2018

 

ADMITTING ATTENDING:  Dr. Lynn Seaman.

 

DISCHARGE ATTENDING:  Dr. Tulio Almaraz.

 

CO-SIGNER:  Tulio Almaraz M.D.

 

CONSULTATIONS

1.  Nephrology, Dr. Jhoan Cleveland.

2.  Infectious Disease, Dr. Enio Goodwin.

3.  General Surgery, Dr. Avina.

 

PROCEDURES:

1.  Chest x-ray done on 04/05/2018 showed stable cardiomegaly.  Stable findings 
were of prior granulomatous disease.  Stable anterior dislocated right 
glenohumeral joint.

2.  Punch biopsy done on 04/06/2018 for evaluation of left AV fistula lesions 
versus infection.  This was submitted for pathology review and the final 
pathology results are still pending.

 

PRIMARY DIAGNOSES:

1.  Skin eruption, unknown cause.

2.  Cellulitis, left arm ruled out.

 

SECONDARY DIAGNOSES:

1.  End-stage renal disease on dialysis.

2.  Chronic asthma.

3.  Obstructive sleep apnea.

4.  Morbid obesity.

5.  Hyperlipidemia.

6.  Peripheral neuropathy.

7.  Pruritus.

8.  Anemia of chronic kidney disease.

9.  Hypertension.

10.  Congestive heart failure.

 

DISCHARGE MEDICATIONS:

1.  Betamethasone cream 1 gram p.o. topical b.i.d. over the affected area.

2.  Nystatin 1 gram topical b.i.d. over the affected area.

3.  ProAir 90 mcg q.6 hours p.r.n.

4.  Atorvastatin 40 mg p.o. at bedtime.

5.  Symbicort 160/4.5 one puff b.i.d.

6.  Celecoxib 200 mg p.o. b.i.d.

7.  Plavix 75 mg daily.

8.  Codeine/guaifenesin 100-10, 2.5 mL p.o. q.6 hours p.r.n.

9.  Vitamin B12, 1000 mcg daily.

10.  Fluticasone 50 mcg at bedtime.

11.  Folic acid 1 mg p.o. daily.

12.  Hydroxyzine 10 mg p.o. t.i.d.

13.  DuoNeb 3 mL q.i.d.

14.  Fosrenol 500 mg p.o. t.i.d. with meals.

15.  Robaxin 500 mg p.o. t.i.d.

16.  Midodrine 2.5 mg p.o. every 6 hours daily.

17.  Singulair 10 mg p.o. daily.

18.  Nortriptyline 50 mg p.o. at bedtime.

19.  Prednisone 2.5 mg p.o. q.a.m.

20.  Lyrica 50 mg p.o. t.i.d. and 100 mg p.o. at bedtime p.r.n.

21.  Mysoline 50 mg p.o. t.i.d.

22.  Silver sulfadiazine 1 application topical b.i.d.

23.  Spiriva 18 mcg daily.

 

DISCONTINUED MEDICATIONS:

1.  Ancef.

2.  Bactrim.

3.  Tramadol.

 

HISTORY OF PRESENT ILLNESS/HOSPITAL COURSE:  The patient is a 64-year-old female
, who originally presented to the ED after she went to her dialysis facility 
and they told her that they could not access her left arm fistula due to an 
overlying infection.  She has had this problem since approximately 12/2017, has 
seen physician multiple times and was treated with Bactrim on and off for 
several months.  She was ultimately referred to Infectious Disease for further 
evaluation and was scheduled on the day of admission to Infectious Disease in 
the outpatient setting.  However, she was admitted for concern of infection or 
cellulitis over the left AV fistula and started on IV antibiotics including her 
clindamycin and Levaquin.  She was seen by Infectious Disease, who said there 
is no concern for bacterial infection, although fungal could not be ruled out 
and ultimately recommended that a biopsy be performed over the left fistula.  
General Surgery was consulted for the punch biopsies of the lesions and this 
was sent for pathological review, which is still pending at this time.  Per 
Infectious Disease recommendations, the patient was started on steroid cream to 
be applied over the affected area as described above.

 

Of note, the patient did report itching throughout this hospital stay and this 
has been a chronic problem.  It was recommended that the patient see her 
primary care provider for further evaluation of the chronic pruritus.

 

The main concern for over the left AV fistula was eosinophilic eruption versus 
lichen planus; however, the pathological results are still pending at this time 
and have not resulted.

 

Overall, the patient had an uncomplicated hospital course and while she was in 
the hospital, the inpatient dialysis was able to access previously fistula and 
continue her dialysis routine of Tuesday, Thursday, and Saturday.

 

DISPOSITION:  The patient left the hospital in stable condition.

 

DISCHARGE INSTRUCTIONS:

1.  Location:  Home.

2.  Diet:  Heart healthy.

3.  Activity:  Ad lety.

4.  Follow up with primary care provider in 7-10 days following discharge.

5.  Follow up with Infectious Disease in 2-3 weeks after discharge.

6.  Follow up with Nephrology as scheduled.

 

RUFINA

## 2018-04-09 NOTE — OP
PREOPERATIVE DIAGNOSES:  Skin lesions upper extremities, end-stage renal disease, morbid obesity stat
us post subtotal parathyroidectomy for secondary hyperparathyroidism.

 

POSTOPERATIVE DIAGNOSES:  Skin lesions upper extremities, end-stage renal disease, morbid obesity, st
atus post subtotal parathyroidectomy for secondary hyperparathyroidism.

 

PROCEDURES PERFORMED:  Multiple punch biopsies, skin lesions, left upper arm for histopathology and m
icrobiology.

 

SURGEON:  Avinash Avina M.D.

 

ANESTHESIA:  1% Xylocaine with epinephrine.

 

PROCEDURE IN DETAIL:  With the patient at bedside in her room, her left upper extremity was prepared 
with ChloraPrep.  Local anesthetic infiltrated into the skin and subcutaneous tissue, 1% Xylocaine wi
th epinephrine was used.  Multiple biopsies taken from the central and periphery of the skin lesion s
ubmitting 3 punch biopsies 6 mm for tissue cultures and 3 for histopathology.  I personally labeled t
he specimen, carried to the Laboratory Department placing 3 of the punch biopsies in formalin for pat
hology and 3 in sterile urine specimen cup for culture.

## 2018-04-18 NOTE — EKG
Test Reason : 

Blood Pressure : ***/*** mmHG

Vent. Rate : 077 BPM     Atrial Rate : 077 BPM

   P-R Int : 198 ms          QRS Dur : 102 ms

    QT Int : 408 ms       P-R-T Axes : 036 002 043 degrees

   QTc Int : 461 ms

 

Normal sinus rhythm

 

 

Confirmed by CHIDI BYRNE ([id number]),  BOBBI MURILLO ([id number]) on 4/18/2018 3:17:14 PM

 

Referred By:             Confirmed By:CHIDI BYRNE

## 2018-08-31 NOTE — OP
PREOPERATIVE DIAGNOSES:

1.  Gastroesophageal reflux disease.

2.  Chronic nausea.

3.  Rectal bleeding.

4.  History of colon polyps.

 

PROCEDURE:  After informed consent was obtained, the patient placed in the left lateral decubitus pos
ition.  Anesthesia was administered per the Anesthesia Department.  Forward-viewing endoscope was ins
erted into the esophagus under direct visualization with ease and passed to the second portion of the
 duodenum with ease.  Second portion of the duodenum and duodenal bulb were normal.  The pylorus, ant
rum, body, fundus, and cardia were normal except for some mild erosions in the gastric antrum.  Biops
ies were taken x4.  Retroflexion in the stomach was normal.  The esophagus was normal throughout.

 

ASSESSMENT:

1.  Mild erosive antritis - status post biopsy.

2.  Otherwise, normal esophagogastroduodenoscopy.

 

RECOMMENDATIONS:

1.  Await histopathology.

2.  Begin proton pump inhibitor.

3.  Proceed with colonoscopy.

 

PROCEDURE IN DETAIL:  After informed consent was obtained, the patient placed in the left lateral dec
ubitus position.  Anesthesia was administered per the Anesthesia Department.  Forward-viewing endosco
pe was inserted into the cecum with ease.  The cecum, ileocecal valve, and appendiceal orifice were n
ormal except for a small polyp.  This polyp was removed with snare electrocautery.  The prep was exce
llent.  The ascending was normal.  The transverse was normal.  Descending was normal except for a sma
ll polyp that was ablated with a snare polypectomy.  In the rectum, there was a small polyp.  This wa
s removed with snare polypectomy.  Left-sided diverticula were noted.  Retroflexion in the rectum db
wed internal hemorrhoids.

 

ASSESSMENT:

1.  Three small colon polyps - status post polypectomy.

2.  Left-sided diverticulosis coli.

3.  Internal hemorrhoids.

 

RECOMMENDATIONS:  Await histopathology.